# Patient Record
Sex: FEMALE | Race: WHITE | Employment: FULL TIME | URBAN - METROPOLITAN AREA
[De-identification: names, ages, dates, MRNs, and addresses within clinical notes are randomized per-mention and may not be internally consistent; named-entity substitution may affect disease eponyms.]

---
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
## 2021-02-13 ENCOUNTER — APPOINTMENT (OUTPATIENT)
Dept: GENERAL RADIOLOGY | Age: 21
End: 2021-02-13
Attending: EMERGENCY MEDICINE
Payer: COMMERCIAL

## 2021-02-13 ENCOUNTER — NURSE TRIAGE (OUTPATIENT)
Dept: OTHER | Facility: CLINIC | Age: 21
End: 2021-02-13

## 2021-02-13 ENCOUNTER — HOSPITAL ENCOUNTER (EMERGENCY)
Age: 21
Discharge: HOME | End: 2021-02-13
Attending: EMERGENCY MEDICINE
Payer: COMMERCIAL

## 2021-02-13 VITALS
OXYGEN SATURATION: 97 % | HEART RATE: 93 BPM | RESPIRATION RATE: 20 BRPM | WEIGHT: 234.35 LBS | SYSTOLIC BLOOD PRESSURE: 137 MMHG | TEMPERATURE: 97.6 F | DIASTOLIC BLOOD PRESSURE: 87 MMHG

## 2021-02-13 DIAGNOSIS — S80.01XA CONTUSION OF RIGHT KNEE, INITIAL ENCOUNTER: Primary | ICD-10-CM

## 2021-02-13 PROCEDURE — 73562 X-RAY EXAM OF KNEE 3: CPT

## 2021-02-13 PROCEDURE — 99283 EMERGENCY DEPT VISIT LOW MDM: CPT

## 2021-02-13 PROCEDURE — 74011250637 HC RX REV CODE- 250/637: Performed by: EMERGENCY MEDICINE

## 2021-02-13 RX ORDER — NORETHINDRONE ACETATE AND ETHINYL ESTRADIOL 1; .02 MG/1; MG/1
1 TABLET ORAL DAILY
COMMUNITY
End: 2021-07-30

## 2021-02-13 RX ORDER — ACETAMINOPHEN 500 MG
1000 TABLET ORAL ONCE
Status: COMPLETED | OUTPATIENT
Start: 2021-02-13 | End: 2021-02-13

## 2021-02-13 RX ADMIN — ACETAMINOPHEN 1000 MG: 500 TABLET ORAL at 21:33

## 2021-02-13 NOTE — LETTER
[address] 
[address] [facility] DEPT 
[address]  [place] 
[phone] Work/School Note Date: 2/13/2021 To Whom It May concern: 
 
Sunshine Vaughn was seen and treated today in the emergency room by the following provider(s): 
Attending Provider: Aron Min MD. Sunshine Vaughn may return to work on 2/15/2021.  
 
Sincerely, 
 
 
 
 
Shama Aguilar MD

## 2021-02-14 NOTE — TELEPHONE ENCOUNTER
Reason for Disposition  • Can't stand (bear weight) or walk    Answer Assessment - Initial Assessment Questions  1. 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 ONSET: \"When did the injury happen?\" (Minutes or hours ago)       2/13/2021 7:15 PM    3. LOCATION: \"Where is the injury located?\"       Right knee- fell and slammed knee on the concrete     4. APPEARANCE of INJURY: \"What does the injury look like?\"       Swollen and red- denies laceration     5. SEVERITY: \"Can you put weight on that leg?\" \"Can you walk?\"       Pt can walk but cannot put full weight on the leg    6. SIZE: For cuts, bruises, or swelling, ask: \"How large is it?\" (e.g., inches or centimeters;  entire joint)       Bruise is 1.5 inches in length on the cap of the right knee    7. PAIN: \"Is there pain?\" If so, ask: \"How bad is the pain?\"    (e.g., Scale 1-10; or mild, moderate, severe)      7/10    8. TETANUS: For any breaks in the skin, ask: \"When was the last tetanus booster?\"      N/A    9. OTHER SYMPTOMS: \"Do you have any other symptoms?\"  (e.g., \"pop\" when knee injured, swelling, locking, buckling)       \"heard a small pop \"    10. PREGNANCY: \"Is there any chance you are pregnant?\" \"When was your last menstrual period?\"        Denies- LMP 1/27/2021    Protocols used: KNEE INJURY-ADULT-    Location of employment: [employer]     Location of injury (body part involved): Right knee     Time of injury: 2/13/2021 7:15PM    Last 4 of SSN: [id number]    Triage indicates for caller to Go to ED now, pt in agreement with this plan     Caller directed to [facility] ED     Care advice provided, caller verbalizes understanding; denies any other questions or concerns.

## 2021-02-14 NOTE — ED NOTES
Discharge paperwork given to pt. All questions and concerns addressed at this time. Pt discharged home in no acute distress and acting age appropriate. Education given to pt about taking motrin/ tylenol and using ice at home for pain and about following up with [facility]. Pt verbalized understanding and has no further questions at this time.

## 2021-02-14 NOTE — ED TRIAGE NOTES
Triage: Pt reports right knee pain after falling in the parking deck while walking into work. Pt with limp into triage. Pt notes when she fell, she fell directly onto right knee with full body weight. No medications taken PTA. No other complaints at this time.

## 2021-02-14 NOTE — ED NOTES
Knee immobilizer applied to right knee. Pt given education about taking care of knee and using knee immobilizer at home. Pt verbalized understanding and has no further questions at this time.
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
## 2021-07-30 ENCOUNTER — NURSE TRIAGE (OUTPATIENT)
Dept: OTHER | Facility: CLINIC | Age: 21
End: 2021-07-30

## 2021-07-30 ENCOUNTER — HOSPITAL ENCOUNTER (EMERGENCY)
Age: 21
Discharge: HOME | End: 2021-07-30
Attending: PEDIATRICS
Payer: COMMERCIAL

## 2021-07-30 ENCOUNTER — APPOINTMENT (OUTPATIENT)
Dept: GENERAL RADIOLOGY | Age: 21
End: 2021-07-30
Attending: PEDIATRICS
Payer: COMMERCIAL

## 2021-07-30 VITALS
SYSTOLIC BLOOD PRESSURE: 139 MMHG | HEART RATE: 86 BPM | DIASTOLIC BLOOD PRESSURE: 97 MMHG | TEMPERATURE: 97.1 F | OXYGEN SATURATION: 97 % | WEIGHT: 250.22 LBS | RESPIRATION RATE: 18 BRPM
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
## 2021-07-30 DIAGNOSIS — S19.9XXA INJURY OF NECK, INITIAL ENCOUNTER: ICD-10-CM

## 2021-07-30 PROCEDURE — 75810000275 HC EMERGENCY DEPT VISIT NO LEVEL OF CARE

## 2021-07-30 PROCEDURE — 99284 EMERGENCY DEPT VISIT MOD MDM: CPT

## 2021-07-30 PROCEDURE — 71046 X-RAY EXAM CHEST 2 VIEWS: CPT

## 2021-07-30 PROCEDURE — 70360 X-RAY EXAM OF NECK: CPT

## 2021-07-30 RX ORDER — FLUOXETINE HYDROCHLORIDE 40 MG/1
CAPSULE ORAL
COMMUNITY
Start: 2021-05-28

## 2021-07-30 NOTE — FORENSIC NURSE
Forensic evaluation and photographs completed. Patient denied any safety concerns at this time.  Care of the patient returned to [employer], RN using SBAR
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
## 2021-07-30 NOTE — DISCHARGE INSTRUCTIONS
How can you care for yourself at home? Keep track of any new symptoms or changes in your symptoms. Take it easy for the next few days, or longer if you are not feeling well. Do not try to do too much. Put ice or a cold pack on any sore areas for 10 to 20 minutes at a time to stop swelling. Put a thin cloth between the ice pack and your skin. Do this several times a day for the first 2 days. Be safe with medicines. Take pain medicines exactly as directed. If the doctor gave you a prescription medicine for pain, take it as prescribed. If you are not taking a prescription pain medicine, ask your doctor if you can take an over-the-counter medicine. Do not drive after taking a prescription pain medicine. Do not do anything that makes the pain worse. Do not drink any alcohol for 24 hours or until your doctor tells you it is okay. When should you call for help? Call 911 if: You passed out (lost consciousness). Call your doctor now or seek immediate medical care if:    You have new or worse belly pain. You have new or worse trouble breathing. You have new or worse head pain. You have new pain, or your pain gets worse. You have new symptoms, such as numbness or vomiting. Watch closely for changes in your health, and be sure to contact your doctor if:    You are not getting better as expected.

## 2021-07-30 NOTE — ED NOTES
Pt discharged home. Pt acting age appropriately, respirations regular and unlabored, cap refill less than two seconds. Skin warm, dry, and intact. Lungs clear bilaterally. No further complaints at this time. Patient verbalized understanding of discharge paperwork and has no further questions at this time. Education provided about continuation of care, follow up care and medication administration. Patient able to provide teach back about discharge instructions.